# Patient Record
Sex: MALE | Race: WHITE | NOT HISPANIC OR LATINO | Employment: FULL TIME | URBAN - METROPOLITAN AREA
[De-identification: names, ages, dates, MRNs, and addresses within clinical notes are randomized per-mention and may not be internally consistent; named-entity substitution may affect disease eponyms.]

---

## 2023-07-28 ENCOUNTER — OFFICE VISIT (OUTPATIENT)
Dept: FAMILY MEDICINE CLINIC | Facility: CLINIC | Age: 38
End: 2023-07-28
Payer: COMMERCIAL

## 2023-07-28 VITALS
WEIGHT: 315 LBS | TEMPERATURE: 99 F | DIASTOLIC BLOOD PRESSURE: 81 MMHG | HEART RATE: 98 BPM | BODY MASS INDEX: 45.1 KG/M2 | RESPIRATION RATE: 16 BRPM | SYSTOLIC BLOOD PRESSURE: 123 MMHG | HEIGHT: 70 IN | OXYGEN SATURATION: 98 %

## 2023-07-28 DIAGNOSIS — L30.9 DERMATITIS: ICD-10-CM

## 2023-07-28 DIAGNOSIS — F34.1 DYSTHYMIA: ICD-10-CM

## 2023-07-28 DIAGNOSIS — J30.2 SEASONAL ALLERGIES: Primary | ICD-10-CM

## 2023-07-28 DIAGNOSIS — G47.33 OSA (OBSTRUCTIVE SLEEP APNEA): ICD-10-CM

## 2023-07-28 RX ORDER — MONTELUKAST SODIUM 10 MG/1
10 TABLET ORAL NIGHTLY
Qty: 30 TABLET | Refills: 5 | Status: SHIPPED | OUTPATIENT
Start: 2023-07-28

## 2023-07-28 RX ORDER — BUPROPION HYDROCHLORIDE 150 MG/1
150 TABLET ORAL DAILY
Qty: 30 TABLET | Refills: 5 | Status: SHIPPED | OUTPATIENT
Start: 2023-07-28

## 2023-07-28 RX ORDER — FAMOTIDINE 40 MG/1
40 TABLET, FILM COATED ORAL DAILY
Qty: 30 TABLET | Refills: 5 | Status: SHIPPED | OUTPATIENT
Start: 2023-07-28

## 2023-07-28 NOTE — PATIENT INSTRUCTIONS
Continue Xyzal.  This could be a total allergy load issue so we added daily Pepcid and Singulair.  Once things settle, you can try tapering off.  I often start these while still on steroids but you've had two rounds so we skipped that.     We added Wellbutrin for mood and we'll see how you feel with that.  You may want to consider counseling and follow up for a physical as well.

## 2023-07-28 NOTE — PROGRESS NOTES
"Subjective     Luis M Servin is a 38 y.o. male who presents with   Chief Complaint   Patient presents with    Rash     Has had for over a month, has done 2 rounds of steroids. Just kept on moving around body and is currently on right side of face. Complains of itchiness and redness        Rash       Dealing with a recurrent rash that was on his entire torso and has had two rounds of steroids. They helped with the irritation and was diffuse blotches.  Right now it's just on his side of his face. Second flare occurred at the end of the steroid pack.  This started over a month prior.  The only difference was a new flavor of Doritos. He has a history of allergies and summer is usually his worse season.  He currently still takes Xyzal and used to take allergy shots.  Ragweed is his biggest irritant.      His mood has been flat.  Not really happy or sad and gradually worsening over the past couple years and his wife pointed it out and he's feeling it too.  Lacking motivation and apathy. He does have sleep apnea and is using his CPAP.              Review of Systems   Skin:  Positive for rash.      Objective     /81 (BP Location: Left arm, Patient Position: Sitting, Cuff Size: Large Adult)   Pulse 98   Temp 99 °F (37.2 °C) (Oral)   Resp 16   Ht 177.8 cm (70\")   Wt (!) 157 kg (346 lb)   SpO2 98%   BMI 49.65 kg/m²     Physical Exam  Constitutional:       Appearance: Normal appearance.   Skin:     Findings: No erythema or rash.      Comments: Indicates itch right side of beard   Neurological:      Mental Status: He is alert.   Psychiatric:         Behavior: Behavior normal.         Thought Content: Thought content normal.       Procedures     Assessment & Plan   Diagnoses and all orders for this visit:    1. Seasonal allergies (Primary)  -     montelukast (Singulair) 10 MG tablet; Take 1 tablet by mouth Every Night.  Dispense: 30 tablet; Refill: 5  -     famotidine (Pepcid) 40 MG tablet; Take 1 tablet by mouth " Daily.  Dispense: 30 tablet; Refill: 5    2. Dermatitis  -     montelukast (Singulair) 10 MG tablet; Take 1 tablet by mouth Every Night.  Dispense: 30 tablet; Refill: 5  -     famotidine (Pepcid) 40 MG tablet; Take 1 tablet by mouth Daily.  Dispense: 30 tablet; Refill: 5    3. LEI (obstructive sleep apnea)    4. Dysthymia  -     buPROPion XL (Wellbutrin XL) 150 MG 24 hr tablet; Take 1 tablet by mouth Daily.  Dispense: 30 tablet; Refill: 5         Discussion    Patient Instructions   Continue Xyzal.  This could be a total allergy load issue so we added daily Pepcid and Singulair.  Once things settle, you can try tapering off.  I often start these while still on steroids but you've had two rounds so we skipped that.     We added Wellbutrin for mood and we'll see how you feel with that.  You may want to consider counseling and follow up for a physical as well.               Nelsy Gonzales MD

## 2023-12-04 PROBLEM — E66.01 MORBID (SEVERE) OBESITY DUE TO EXCESS CALORIES: Status: ACTIVE | Noted: 2023-12-04

## 2023-12-05 ENCOUNTER — OFFICE VISIT (OUTPATIENT)
Dept: FAMILY MEDICINE CLINIC | Facility: CLINIC | Age: 38
End: 2023-12-05
Payer: COMMERCIAL

## 2023-12-05 VITALS
DIASTOLIC BLOOD PRESSURE: 85 MMHG | SYSTOLIC BLOOD PRESSURE: 138 MMHG | OXYGEN SATURATION: 99 % | RESPIRATION RATE: 16 BRPM | WEIGHT: 315 LBS | BODY MASS INDEX: 45.1 KG/M2 | TEMPERATURE: 98.8 F | HEART RATE: 84 BPM | HEIGHT: 70 IN

## 2023-12-05 DIAGNOSIS — J30.2 SEASONAL ALLERGIES: ICD-10-CM

## 2023-12-05 DIAGNOSIS — F34.1 DYSTHYMIA: ICD-10-CM

## 2023-12-05 DIAGNOSIS — I87.2 VENOUS STASIS DERMATITIS OF BOTH LOWER EXTREMITIES: ICD-10-CM

## 2023-12-05 DIAGNOSIS — E66.01 CLASS 3 SEVERE OBESITY DUE TO EXCESS CALORIES WITH SERIOUS COMORBIDITY AND BODY MASS INDEX (BMI) OF 45.0 TO 49.9 IN ADULT: ICD-10-CM

## 2023-12-05 DIAGNOSIS — Z13.220 NEED FOR LIPID SCREENING: ICD-10-CM

## 2023-12-05 DIAGNOSIS — Z00.00 ANNUAL PHYSICAL EXAM: Primary | ICD-10-CM

## 2023-12-05 DIAGNOSIS — R41.3 MEMORY CHANGES: ICD-10-CM

## 2023-12-05 DIAGNOSIS — G47.33 OSA (OBSTRUCTIVE SLEEP APNEA): ICD-10-CM

## 2023-12-05 PROBLEM — L30.9 DERMATITIS: Status: RESOLVED | Noted: 2023-07-28 | Resolved: 2023-12-05

## 2023-12-05 RX ORDER — MAGNESIUM OXIDE/MAG AA CHELATE 300 MG
CAPSULE ORAL EVERY 24 HOURS
COMMUNITY

## 2023-12-05 NOTE — PATIENT INSTRUCTIONS
I have ordered lab tests today.  You should receive a phone call or a [employer] message with those results.  If you have not heard from us in 7-10 days, please call the office.      We talked about doing a psychological assessment as a next step for the memory.     We decided to switch allergy meds to Flonase and Astepro instead of Xyzal and montelukast.

## 2023-12-05 NOTE — ASSESSMENT & PLAN NOTE
Patient's (Body mass index is 47.49 kg/m².) indicates that they are morbidly/severely obese (BMI > 40 or > 35 with obesity - related health condition) with health conditions that include obstructive sleep apnea . Weight is improving with lifestyle modifications. BMI  is above average; BMI management plan is completed. We discussed increasing exercise and continuing healthy food choices and not eating after 8 since that's helpful .

## 2023-12-05 NOTE — PROGRESS NOTES
"Chief Complaint  Annual Exam    Subjective    {CC  Problem List  Visit  Diagnosis   Encounters  Notes  Medications  Labs  Result Review Imaging  Media :23}     Lui sM Servin presents to [facility] for Annual Exam.    History of Present Illness     Annual Exam     Colonoscopy: too young   PSA: not indicated  Vaccines: up to date  Tobacco: never  Alcohol: infrequent  Exercise: infrequent but active with the kids  Sunscreen: no  Hepatitis C screening: yes    LEI on CPAP.  He's lost 15# with avoiding eating after 8 and limiting processed foods.     He's having some increased forgetfulness.  He leaves the room to do something and forgets what he was going to do.  Sometimes he remembers and sometimes doesn't. It's occurring multiple times a day and that's why it concerns him. In discussion he notes that he used to take B12 pills and doesn't now.  He doesn't know if that is part of it.     Seasonal allergies have not improved this fall and has been taking Singulair and Xyzal.  He doesn't know if he should continue.      with 5 kids and only 3 still at home.  The 19 year old has moved out.  His 11 year old spends most of his time in [place] with his mom.  Kids at home are 7,6 and 3.       Improving on Wellbutrin for his mood.    The rash we talked about last time eventually just went away and wonders if it was covid related.      Review of Systems     Objective       Vital Signs:   /85 (BP Location: Right arm, Patient Position: Sitting, Cuff Size: Large Adult)   Pulse 84   Temp 98.8 °F (37.1 °C) (Oral)   Resp 16   Ht 177.8 cm (70\")   Wt (!) 150 kg (331 lb)   SpO2 99%   BMI 47.49 kg/m²     Body mass index is 47.49 kg/m².       PHQ-9 Total Score:       Physical Exam  Constitutional:       General: He is not in acute distress.     Appearance: Normal appearance.   HENT:      Head: Normocephalic and atraumatic.      Nose: Nose normal.   Eyes:      Conjunctiva/sclera: " Conjunctivae normal.      Pupils: Pupils are equal, round, and reactive to light.   Cardiovascular:      Rate and Rhythm: Normal rate and regular rhythm.      Heart sounds: Normal heart sounds.   Pulmonary:      Effort: Pulmonary effort is normal.      Breath sounds: Normal breath sounds.   Abdominal:      General: Bowel sounds are normal.      Palpations: Abdomen is soft.   Musculoskeletal:      Cervical back: Neck supple.   Skin:     General: Skin is warm.      Findings: Erythema (slight of bilateral lower extremities.) present.   Neurological:      General: No focal deficit present.      Mental Status: He is alert.      Gait: Gait normal.   Psychiatric:         Behavior: Behavior normal.          Result Review  Data Reviewed:{ Labs  Result Review  Imaging  Med Tab  Media :23}               Discussed healthy diet, exercise, adequate sleep, cancer screening, immunizations and preventative care. Annual eye exam and routine dental cleaning encouraged.        Assessment and Plan {CC Problem List  Visit Diagnosis  ROS  Review (Popup)  Physician Practice [employer] Maintenance  Quality  BestPractice  Medications  SmartSets  [employer] Encounters  Media :23}   Diagnoses and all orders for this visit:    1. Annual physical exam (Primary)    2. LEI (obstructive sleep apnea)  Assessment & Plan:  With consistent CPAP use      3. Dysthymia  Assessment & Plan:  Improved with buproprion      4. Need for lipid screening  -     Comprehensive Metabolic Panel  -     Lipid Panel    5. Memory changes  Assessment & Plan:  Uncertain if it's metabolic so we're starting there.     Orders:  -     TSH  -     Vitamin B12    6. Class 3 severe obesity due to excess calories with serious comorbidity and body mass index (BMI) of 45.0 to 49.9 in adult  Assessment & Plan:  Patient's (Body mass index is 47.49 kg/m².) indicates that they are morbidly/severely obese (BMI > 40 or > 35 with obesity - related health condition) with health conditions that  include obstructive sleep apnea . Weight is improving with lifestyle modifications. BMI  is above average; BMI management plan is completed. We discussed increasing exercise and continuing healthy food choices and not eating after 8 since that's helpful .       7. Seasonal allergies  Assessment & Plan:  We decided to try Astepro and Flonase instead of montelukast and Xyzal since you haven't noticed a change.       8. Venous stasis dermatitis of both lower extremities  Assessment & Plan:  We talked about moving more and at least flexing and pointing the feet when sitting.           Patient Instructions   I have ordered lab tests today.  You should receive a phone call or a [employer] message with those results.  If you have not heard from us in 7-10 days, please call the office.      We talked about doing a psychological assessment as a next step for the memory.     We decided to switch allergy meds to Flonase and Astepro instead of Xyzal and montelukast.        No follow-ups on file.    Nelsy Gonzales MD

## 2023-12-06 LAB
ALBUMIN SERPL-MCNC: 4.3 G/DL (ref 4.1–5.1)
ALBUMIN/GLOB SERPL: 1.3 {RATIO} (ref 1.2–2.2)
ALP SERPL-CCNC: 97 IU/L (ref 44–121)
ALT SERPL-CCNC: 41 IU/L (ref 0–44)
AST SERPL-CCNC: 22 IU/L (ref 0–40)
BILIRUB SERPL-MCNC: 0.6 MG/DL (ref 0–1.2)
BUN SERPL-MCNC: 13 MG/DL (ref 6–20)
BUN/CREAT SERPL: 13 (ref 9–20)
CALCIUM SERPL-MCNC: 9.4 MG/DL (ref 8.7–10.2)
CHLORIDE SERPL-SCNC: 100 MMOL/L (ref 96–106)
CHOLEST SERPL-MCNC: 217 MG/DL (ref 100–199)
CO2 SERPL-SCNC: 22 MMOL/L (ref 20–29)
CREAT SERPL-MCNC: 1.02 MG/DL (ref 0.76–1.27)
EGFRCR SERPLBLD CKD-EPI 2021: 97 ML/MIN/1.73
GLOBULIN SER CALC-MCNC: 3.2 G/DL (ref 1.5–4.5)
GLUCOSE SERPL-MCNC: 87 MG/DL (ref 70–99)
HDLC SERPL-MCNC: 40 MG/DL
LDLC SERPL CALC-MCNC: 134 MG/DL (ref 0–99)
POTASSIUM SERPL-SCNC: 4.4 MMOL/L (ref 3.5–5.2)
PROT SERPL-MCNC: 7.5 G/DL (ref 6–8.5)
SODIUM SERPL-SCNC: 139 MMOL/L (ref 134–144)
TRIGL SERPL-MCNC: 241 MG/DL (ref 0–149)
TSH SERPL DL<=0.005 MIU/L-ACNC: 1.59 UIU/ML (ref 0.45–4.5)
VIT B12 SERPL-MCNC: 1271 PG/ML (ref 232–1245)
VLDLC SERPL CALC-MCNC: 43 MG/DL (ref 5–40)

## 2024-01-26 DIAGNOSIS — F34.1 DYSTHYMIA: ICD-10-CM

## 2024-01-26 RX ORDER — BUPROPION HYDROCHLORIDE 150 MG/1
150 TABLET ORAL DAILY
Qty: 90 TABLET | Refills: 1 | Status: SHIPPED | OUTPATIENT
Start: 2024-01-26

## 2024-07-21 DIAGNOSIS — F34.1 DYSTHYMIA: ICD-10-CM

## 2024-07-21 RX ORDER — BUPROPION HYDROCHLORIDE 150 MG/1
150 TABLET ORAL DAILY
Qty: 90 TABLET | Refills: 1 | Status: SHIPPED | OUTPATIENT
Start: 2024-07-21